# Patient Record
Sex: MALE | Race: WHITE | Employment: PART TIME | URBAN - METROPOLITAN AREA
[De-identification: names, ages, dates, MRNs, and addresses within clinical notes are randomized per-mention and may not be internally consistent; named-entity substitution may affect disease eponyms.]

---

## 2018-04-02 ENCOUNTER — TELEPHONE (OUTPATIENT)
Dept: INTERNAL MEDICINE CLINIC | Age: 28
End: 2018-04-02

## 2020-01-03 ENCOUNTER — HOSPITAL ENCOUNTER (EMERGENCY)
Age: 30
Discharge: HOME | End: 2020-01-03
Attending: EMERGENCY MEDICINE
Payer: COMMERCIAL

## 2020-01-03 ENCOUNTER — APPOINTMENT (OUTPATIENT)
Dept: CT IMAGING | Age: 30
End: 2020-01-03
Payer: COMMERCIAL

## 2020-01-03 VITALS
HEART RATE: 90 BPM | SYSTOLIC BLOOD PRESSURE: 124 MMHG | TEMPERATURE: 98.1 F | WEIGHT: 265 LBS | BODY MASS INDEX: 35.89 KG/M2 | OXYGEN SATURATION: 98 % | HEIGHT: 72 IN | DIASTOLIC BLOOD PRESSURE: 71 MMHG | RESPIRATION RATE: 16 BRPM

## 2020-01-03 LAB
ALBUMIN SERPL-MCNC: 4.6 G/DL (ref 3.5–4.6)
ALP BLD-CCNC: 50 U/L (ref 35–104)
ALT SERPL-CCNC: 63 U/L (ref 0–41)
ANION GAP SERPL CALCULATED.3IONS-SCNC: 9 MEQ/L (ref 9–15)
AST SERPL-CCNC: 34 U/L (ref 0–40)
BASOPHILS ABSOLUTE: 0 K/UL (ref 0–0.2)
BASOPHILS RELATIVE PERCENT: 0.7 %
BILIRUB SERPL-MCNC: 0.3 MG/DL (ref 0.2–0.7)
BUN BLDV-MCNC: 12 MG/DL (ref 6–20)
CALCIUM SERPL-MCNC: 9.4 MG/DL (ref 8.5–9.9)
CHLORIDE BLD-SCNC: 98 MEQ/L (ref 95–107)
CO2: 30 MEQ/L (ref 20–31)
CREAT SERPL-MCNC: 0.71 MG/DL (ref 0.7–1.2)
EOSINOPHILS ABSOLUTE: 0.1 K/UL (ref 0–0.7)
EOSINOPHILS RELATIVE PERCENT: 2.5 %
GFR AFRICAN AMERICAN: >60
GFR NON-AFRICAN AMERICAN: >60
GLOBULIN: 3.2 G/DL (ref 2.3–3.5)
GLUCOSE BLD-MCNC: 98 MG/DL (ref 70–99)
HCT VFR BLD CALC: 41.4 % (ref 42–52)
HEMOGLOBIN: 14.2 G/DL (ref 14–18)
LYMPHOCYTES ABSOLUTE: 1.7 K/UL (ref 1–4.8)
LYMPHOCYTES RELATIVE PERCENT: 37.1 %
MCH RBC QN AUTO: 31.1 PG (ref 27–31.3)
MCHC RBC AUTO-ENTMCNC: 34.2 % (ref 33–37)
MCV RBC AUTO: 90.8 FL (ref 80–100)
MONOCYTES ABSOLUTE: 0.6 K/UL (ref 0.2–0.8)
MONOCYTES RELATIVE PERCENT: 12.5 %
NEUTROPHILS ABSOLUTE: 2.2 K/UL (ref 1.4–6.5)
NEUTROPHILS RELATIVE PERCENT: 47.2 %
PDW BLD-RTO: 12.6 % (ref 11.5–14.5)
PLATELET # BLD: 224 K/UL (ref 130–400)
POC CREATININE WHOLE BLOOD: 0.8
POTASSIUM SERPL-SCNC: 4.2 MEQ/L (ref 3.4–4.9)
RBC # BLD: 4.57 M/UL (ref 4.7–6.1)
SODIUM BLD-SCNC: 137 MEQ/L (ref 135–144)
TOTAL PROTEIN: 7.8 G/DL (ref 6.3–8)
WBC # BLD: 4.7 K/UL (ref 4.8–10.8)

## 2020-01-03 PROCEDURE — 70496 CT ANGIOGRAPHY HEAD: CPT

## 2020-01-03 PROCEDURE — 99284 EMERGENCY DEPT VISIT MOD MDM: CPT

## 2020-01-03 PROCEDURE — 85025 COMPLETE CBC W/AUTO DIFF WBC: CPT

## 2020-01-03 PROCEDURE — 80053 COMPREHEN METABOLIC PANEL: CPT

## 2020-01-03 PROCEDURE — 6360000004 HC RX CONTRAST MEDICATION: Performed by: EMERGENCY MEDICINE

## 2020-01-03 PROCEDURE — 36415 COLL VENOUS BLD VENIPUNCTURE: CPT

## 2020-01-03 RX ORDER — M-VIT,TX,IRON,MINS/CALC/FOLIC 27MG-0.4MG
1 TABLET ORAL DAILY
COMMUNITY

## 2020-01-03 RX ORDER — METOCLOPRAMIDE HYDROCHLORIDE 5 MG/ML
10 INJECTION INTRAMUSCULAR; INTRAVENOUS ONCE
Status: DISCONTINUED | OUTPATIENT
Start: 2020-01-03 | End: 2020-01-03 | Stop reason: HOSPADM

## 2020-01-03 RX ORDER — DIPHENHYDRAMINE HYDROCHLORIDE 50 MG/ML
25 INJECTION INTRAMUSCULAR; INTRAVENOUS ONCE
Status: DISCONTINUED | OUTPATIENT
Start: 2020-01-03 | End: 2020-01-03 | Stop reason: HOSPADM

## 2020-01-03 RX ORDER — 0.9 % SODIUM CHLORIDE 0.9 %
1000 INTRAVENOUS SOLUTION INTRAVENOUS ONCE
Status: DISCONTINUED | OUTPATIENT
Start: 2020-01-03 | End: 2020-01-03 | Stop reason: HOSPADM

## 2020-01-03 RX ORDER — ACETAMINOPHEN 500 MG
1000 TABLET ORAL ONCE
Status: DISCONTINUED | OUTPATIENT
Start: 2020-01-03 | End: 2020-01-03 | Stop reason: HOSPADM

## 2020-01-03 RX ORDER — KETOROLAC TROMETHAMINE 30 MG/ML
30 INJECTION, SOLUTION INTRAMUSCULAR; INTRAVENOUS ONCE
Status: DISCONTINUED | OUTPATIENT
Start: 2020-01-03 | End: 2020-01-03 | Stop reason: HOSPADM

## 2020-01-03 RX ORDER — METOCLOPRAMIDE 10 MG/1
10 TABLET ORAL 2 TIMES DAILY PRN
Qty: 60 TABLET | Refills: 0 | Status: SHIPPED | OUTPATIENT
Start: 2020-01-03

## 2020-01-03 RX ADMIN — IOPAMIDOL 100 ML: 755 INJECTION, SOLUTION INTRAVENOUS at 15:18

## 2020-01-03 ASSESSMENT — PAIN DESCRIPTION - PAIN TYPE: TYPE: ACUTE PAIN

## 2020-01-03 ASSESSMENT — ENCOUNTER SYMPTOMS
NAUSEA: 0
BACK PAIN: 0
SORE THROAT: 0
DIARRHEA: 0
SHORTNESS OF BREATH: 0
COUGH: 0
VOMITING: 0
ABDOMINAL PAIN: 0

## 2020-01-03 ASSESSMENT — PAIN DESCRIPTION - ONSET: ONSET: ON-GOING

## 2020-01-03 ASSESSMENT — PAIN DESCRIPTION - FREQUENCY: FREQUENCY: INTERMITTENT

## 2020-01-03 ASSESSMENT — PAIN DESCRIPTION - DESCRIPTORS: DESCRIPTORS: ACHING

## 2020-01-03 ASSESSMENT — PAIN DESCRIPTION - PROGRESSION: CLINICAL_PROGRESSION: GRADUALLY WORSENING

## 2020-01-03 ASSESSMENT — PAIN DESCRIPTION - LOCATION: LOCATION: HEAD;NECK

## 2020-01-03 ASSESSMENT — PAIN SCALES - GENERAL: PAINLEVEL_OUTOF10: 2

## 2020-01-03 NOTE — ED PROVIDER NOTES
[address] [facility] ED  eMERGENCYdEPARTMENT eNCOUnter      Pt Name: Severiano Saleh  MRN: [id number]  Gustavogfadin 1990  Date of evaluation: 1/3/2020  Jose Juan Chaparro MD    CHIEF COMPLAINT           HPI  Severiano Saleh is a 34 y.o. male per chart review has a h/o asthma, psoriasis presents to the ED with headache. Pt notes for the past couple months he gets gradual onset, moderate, constant, throbbing, posterior headache with exertion or sex. After headache pt notes tingling in bilateral arms and legs. Pt denies fever, n/v, cp, sob, dysuria, diarrhea. Pt currently notes headache. ROS  Review of Systems   Constitutional: Negative for activity change, chills and fever. HENT: Negative for ear pain and sore throat. Eyes: Negative for visual disturbance. Respiratory: Negative for cough and shortness of breath. Cardiovascular: Negative for chest pain, palpitations and leg swelling. Gastrointestinal: Negative for abdominal pain, diarrhea, nausea and vomiting. Genitourinary: Negative for dysuria. Musculoskeletal: Negative for back pain. Skin: Negative for rash. Neurological: Positive for numbness and headaches. Negative for dizziness and weakness. Except as noted above the remainder of the review of systems was reviewed and negative.        PAST MEDICAL HISTORY     Past Medical History:   Diagnosis Date    Acne     Allergic rhinitis     Allergic rhinitis     Asthma     EXERCISE INDUCED    Chondromalacia KNEES    PAINFUL KNEES-ATHLETICS    Psoriasis     Not verified    Scalp cyst [id number]         SURGICAL HISTORY       Past Surgical History:   Procedure Laterality Date    FRACTURE SURGERY  L WRIST         CURRENTMEDICATIONS       Previous Medications    LORATADINE (CLARITIN PO)    Take by mouth    MULTIPLE VITAMINS-MINERALS (THERAPEUTIC MULTIVITAMIN-MINERALS) TABLET    Take 1 tablet by mouth daily       ALLERGIES     B2-b3-beta [id number]-c-[id number]-[id number]-mk [prosight complete or] nuchal rigidity  Cardiovascular:      Rate and Rhythm: Normal rate and regular rhythm. Heart sounds: Normal heart sounds. Pulmonary:      Effort: Pulmonary effort is normal.      Breath sounds: Normal breath sounds. Abdominal:      General: Bowel sounds are normal. There is no distension. Palpations: Abdomen is soft. Tenderness: There is no tenderness. Musculoskeletal: Normal range of motion. Skin:     General: Skin is warm and dry. Neurological:      Mental Status: He is alert and oriented to person, place, and time. Psychiatric:         Mood and Affect: Mood normal.           MDM  35 yo male presents to the ED with headache. Pt is afebrile, hemodynamically stable. Pt given 1 L NS, IV reglan, IV benadryl, IV toradol, PO tylenol with moderate relief. Labs unremarkable. Due to concern for aneurysm, ICH, CTA head done. CTA head negative. Pt reassessed and feels completely better. Pt educated about headache and migraines. Pt given prescription for reglan, given headache warning signs and will f/u with pcp. Pt understands plan. FINAL IMPRESSION      1.  Acute nonintractable headache, unspecified headache type          DISPOSITION/PLAN   DISPOSITION Decision To Discharge 01/03/2020 04:12:26 PM        DISCHARGE MEDICATIONS:  [unfilled]         Denilson Godwin MD(electronically signed)  Attending Emergency Physician            Denilson Godwin MD  01/03/20 [id number]

## 2020-01-03 NOTE — DISCHARGE INSTR - COC
Therapy:        Elimination:  Continence:   · Bowel: {YES / EV:64552}  · Bladder: {YES / AL:86084}  Urinary Catheter: {Urinary Catheter:328539796}   Colostomy/Ileostomy/Ileal Conduit: {YES / FL:97735}       Date of Last BM: ***  No intake or output data in the 24 hours ending 20 1613  No intake/output data recorded.     Safety Concerns:     [facility] Safety Concerns:544246944}    Impairments/Disabilities:      [facility] Impairments/Disabilities:844177361}    Nutrition Therapy:  Current Nutrition Therapy:   [facility] Diet List:171058815}    Routes of Feeding: {CHP DME Other Feedings:053456596}  Liquids: {Slp liquid thickness:91298}  Daily Fluid Restriction: {CHP DME Yes amt example:031663424}  Last Modified Barium Swallow with Video (Video Swallowing Test): {Done Not Done RTMO:658241308}    Treatments at the Time of Hospital Discharge:   Respiratory Treatments: ***  Oxygen Therapy:  {Therapy; copd oxygen:87298}  Ventilator:    { CC Vent CSKI:782453640}    Rehab Therapies: {THERAPEUTIC INTERVENTION:6919029640}  Weight Bearing Status/Restrictions: [address] [place] [address] Weight Bearin}  Other Medical Equipment (for information only, NOT a DME order):  {EQUIPMENT:753035475}  Other Treatments: ***    Patient's personal belongings (please select all that are sent with patient):  {[facility] DME Belongings:904321441}    RN SIGNATURE:  {Esignature:591022910}    CASE MANAGEMENT/SOCIAL WORK SECTION    Inpatient Status Date: ***    Readmission Risk Assessment Score:  Readmission Risk              Risk of Unplanned Readmission:        0           Discharging to Facility/ Agency   · Name:   · Address:  · Phone:  · Fax:    Dialysis Facility (if applicable)   · Name:  · Address:  · Dialysis Schedule:  · Phone:  · Fax:    / signature: {Esignature:667566643}    PHYSICIAN SECTION    Prognosis: {Prognosis:3002938888}    Condition at Discharge: [facility] Patient Condition:447900397}    Rehab Potential (if transferring to Rehab): {Prognosis:4247589210}    Recommended Labs or Other Treatments After Discharge: ***    Physician Certification: I certify the above information and transfer of Aurora Martinez  is necessary for the continuing treatment of the diagnosis listed and that he requires {Admit to Appropriate Level of Care:30893} for {GREATER/LESS:818484195} 30 days.      Update Admission H&P: {CHP DME Changes in EGBXS:776971871}    PHYSICIAN SIGNATURE:  {Esignature:694375766}

## 2020-01-03 NOTE — ED TRIAGE NOTES
Patient arrived from home with complaint of headache, increased HR, and neck pain since having intercourse with girlfriend on Monday. Patient states \"when I have sex I have no pain then when I am about to orgasm I can feel the headache coming on and it wont go away\". msp intact. Neuro wnl. No distress noted.

## 2020-01-04 LAB
GFR AFRICAN AMERICAN: >60
GFR NON-AFRICAN AMERICAN: >60
PERFORMED ON: ABNORMAL
POC CREATININE: 0.8 MG/DL (ref 0.9–1.3)
POC SAMPLE TYPE: ABNORMAL

## 2020-10-06 ENCOUNTER — TELEPHONE (OUTPATIENT)
Dept: FAMILY MEDICINE CLINIC | Age: 30
End: 2020-10-06

## 2020-10-06 ENCOUNTER — VIRTUAL VISIT (OUTPATIENT)
Dept: FAMILY MEDICINE CLINIC | Age: 30
End: 2020-10-06
Payer: COMMERCIAL

## 2020-10-06 ENCOUNTER — NURSE ONLY (OUTPATIENT)
Dept: FAMILY MEDICINE CLINIC | Age: 30
End: 2020-10-06

## 2020-10-06 DIAGNOSIS — Z20.822 COVID-19 RULED OUT: ICD-10-CM

## 2020-10-06 PROCEDURE — 99442 PR PHYS/QHP TELEPHONE EVALUATION 11-20 MIN: CPT | Performed by: PHYSICIAN ASSISTANT

## 2020-10-06 RX ORDER — LORATADINE 10 MG/1
10 TABLET ORAL DAILY
Qty: 30 TABLET | Refills: 3 | Status: SHIPPED | OUTPATIENT
Start: 2020-10-06

## 2020-10-06 SDOH — ECONOMIC STABILITY: INCOME INSECURITY: HOW HARD IS IT FOR YOU TO PAY FOR THE VERY BASICS LIKE FOOD, HOUSING, MEDICAL CARE, AND HEATING?: NOT HARD AT ALL

## 2020-10-06 SDOH — ECONOMIC STABILITY: FOOD INSECURITY: WITHIN THE PAST 12 MONTHS, YOU WORRIED THAT YOUR FOOD WOULD RUN OUT BEFORE YOU GOT MONEY TO BUY MORE.: NEVER TRUE

## 2020-10-06 SDOH — ECONOMIC STABILITY: FOOD INSECURITY: WITHIN THE PAST 12 MONTHS, THE FOOD YOU BOUGHT JUST DIDN'T LAST AND YOU DIDN'T HAVE MONEY TO GET MORE.: NEVER TRUE

## 2020-10-06 SDOH — ECONOMIC STABILITY: TRANSPORTATION INSECURITY
IN THE PAST 12 MONTHS, HAS THE LACK OF TRANSPORTATION KEPT YOU FROM MEDICAL APPOINTMENTS OR FROM GETTING MEDICATIONS?: NO

## 2020-10-06 SDOH — ECONOMIC STABILITY: TRANSPORTATION INSECURITY
IN THE PAST 12 MONTHS, HAS LACK OF TRANSPORTATION KEPT YOU FROM MEETINGS, WORK, OR FROM GETTING THINGS NEEDED FOR DAILY LIVING?: NO

## 2020-10-06 ASSESSMENT — ENCOUNTER SYMPTOMS
VOMITING: 0
RHINORRHEA: 0
NAUSEA: 1
DIARRHEA: 1
ABDOMINAL PAIN: 0
COUGH: 1
SHORTNESS OF BREATH: 1
SORE THROAT: 1

## 2020-10-06 ASSESSMENT — PATIENT HEALTH QUESTIONNAIRE - PHQ9
2. FEELING DOWN, DEPRESSED OR HOPELESS: 0
SUM OF ALL RESPONSES TO PHQ QUESTIONS 1-9: 0
1. LITTLE INTEREST OR PLEASURE IN DOING THINGS: 0
SUM OF ALL RESPONSES TO PHQ9 QUESTIONS 1 & 2: 0
SUM OF ALL RESPONSES TO PHQ QUESTIONS 1-9: 0

## 2020-10-06 NOTE — TELEPHONE ENCOUNTER
Patient will need a note excusing him from work and school for the next 2 weeks.   His mother will  the note

## 2020-10-06 NOTE — PROGRESS NOTES
Breath sounds: No wheezing. Neurological:      Mental Status: He is oriented to person, place, and time. Psychiatric:         Mood and Affect: Mood normal.         Behavior: Behavior normal.         Thought Content: Thought content normal.         Judgment: Judgment normal.              Assessment & Plan    Diagnosis Orders   1. COVID-19 ruled out  COVID-19 Ambulatory   2. Allergic rhinitis, unspecified seasonality, unspecified trigger           Orders Placed This Encounter   Procedures    COVID-19 Ambulatory     Standing Status:   Future     Standing Expiration Date:   10/6/2021     Scheduling Instructions:      Saline media preferred given current shortage of viral transport media but both acceptable     Order Specific Question:   Is this test for diagnosis or screening? Answer:   Diagnosis of ill patient     Order Specific Question:   Symptomatic for COVID-19 as defined by CDC? Answer:   Yes     Order Specific Question:   Date of Symptom Onset     Answer:   10/4/2020     Order Specific Question:   Hospitalized for COVID-19? Answer:   No     Order Specific Question:   Admitted to ICU for COVID-19? Answer:   No     Order Specific Question:   Employed in healthcare setting? Answer:   No     Order Specific Question:   Resident in a congregate (group) care setting? Answer:   No     Order Specific Question:   Pregnant: Answer:   No     Order Specific Question:   Previously tested for COVID-19? Answer:   No     Orders Placed This Encounter   Medications    loratadine (CLARITIN) 10 MG tablet     Sig: Take 1 tablet by mouth daily     Dispense:  30 tablet     Refill:  3     Medications Discontinued During This Encounter   Medication Reason    Loratadine (CLARITIN PO) REORDER     Return if symptoms worsen or fail to improve, for call for results, follow up with your PCP as directed. An electronic signature was used to authenticate this note.     --Katharina Casanova PA-C on 10/6/2020 at 12:13 PM

## 2020-10-07 ENCOUNTER — VIRTUAL VISIT (OUTPATIENT)
Dept: FAMILY MEDICINE CLINIC | Age: 30
End: 2020-10-07
Payer: COMMERCIAL

## 2020-10-07 PROCEDURE — G8484 FLU IMMUNIZE NO ADMIN: HCPCS | Performed by: PHYSICIAN ASSISTANT

## 2020-10-07 PROCEDURE — 1036F TOBACCO NON-USER: CPT | Performed by: PHYSICIAN ASSISTANT

## 2020-10-07 PROCEDURE — 99213 OFFICE O/P EST LOW 20 MIN: CPT | Performed by: PHYSICIAN ASSISTANT

## 2020-10-07 PROCEDURE — G8417 CALC BMI ABV UP PARAM F/U: HCPCS | Performed by: PHYSICIAN ASSISTANT

## 2020-10-07 PROCEDURE — G8428 CUR MEDS NOT DOCUMENT: HCPCS | Performed by: PHYSICIAN ASSISTANT

## 2020-10-07 RX ORDER — TOBRAMYCIN 0.3 %
0.5 OINTMENT (GRAM) OPHTHALMIC (EYE) 2 TIMES DAILY
Qty: 1 TUBE | Refills: 0 | Status: SHIPPED | OUTPATIENT
Start: 2020-10-07 | End: 2020-10-17

## 2020-10-07 ASSESSMENT — ENCOUNTER SYMPTOMS
EYE PAIN: 0
EYE DISCHARGE: 0
PHOTOPHOBIA: 0
EYE ITCHING: 0
EYE REDNESS: 0

## 2020-10-07 NOTE — PROGRESS NOTES
10/7/2020     Rita Gray (:  1990) is a 31 y.o. male, here for evaluation of the following medical concerns:  Redness/swelling of right upper eyelid  HPI  Telemedicine doxy video visit due to concern for exposure to Covid 19 (coronavirus). Patient is aware this is a billable visit patient was identified at start of exam patient was at his home while I practiced  Remotely. With complaint of pain and swelling of the right upper eyelid since yesterday. States pain is minimal today denies visual change does not wear corrective lenses denies injury    Review of Systems   Eyes: Negative for photophobia, pain, discharge, redness, itching and visual disturbance. All other systems reviewed and are negative. Prior to Visit Medications    Medication Sig Taking? Authorizing Provider   loratadine (CLARITIN) 10 MG tablet Take 1 tablet by mouth daily  Katharina Casanova PA-C   Multiple Vitamins-Minerals (THERAPEUTIC MULTIVITAMIN-MINERALS) tablet Take 1 tablet by mouth daily  Historical Provider, MD   metoclopramide (REGLAN) 10 MG tablet Take 1 tablet by mouth 2 times daily as needed (Headache)  Andrew Nava MD        Social History     Tobacco Use    Smoking status: Never Smoker    Smokeless tobacco: Former User   Substance Use Topics    Alcohol use: Yes     Comment: RARE        There were no vitals filed for this visit. Estimated body mass index is 35.94 kg/m² as calculated from the following:    Height as of 1/3/20: 6' (1.829 m). Weight as of 1/3/20: 265 lb (120.2 kg). Physical Exam  Constitutional:       General: He is not in acute distress. Appearance: Normal appearance. HENT:      Head: Normocephalic and atraumatic. Eyes:      Extraocular Movements: Extraocular movements intact.       Conjunctiva/sclera: Conjunctivae normal.      Comments: Erythema mild swelling right upper eyelid patient appears to have a papular lesion along medial upper eyelash   Pulmonary:      Effort: Pulmonary effort is normal. No respiratory distress. Breath sounds: No wheezing. Skin:     General: Skin is warm and dry. Coloration: Skin is not pale. Neurological:      Mental Status: He is alert and oriented to person, place, and time. Psychiatric:         Mood and Affect: Mood normal.         Thought Content: Thought content normal.         Judgment: Judgment normal.         ASSESSMENT/PLAN:  There are no diagnoses linked to this encounter. No follow-ups on file. An electronic signature was used to authenticate this note.     --Katharina Casanova PA-C on 10/7/2020 at 12:14 PM

## 2020-10-08 LAB
SARS-COV-2: NOT DETECTED
SOURCE: NORMAL

## 2020-10-09 ENCOUNTER — TELEPHONE (OUTPATIENT)
Dept: FAMILY MEDICINE CLINIC | Age: 30
End: 2020-10-09

## 2020-10-09 NOTE — TELEPHONE ENCOUNTER
Pt dropped off return to work form for [employer] program. Needs signed ASAP. Contact Pt when ready for . Pt advised could take up to 72 hours. Form scanned in media, attached to encounter & placed in box.

## 2020-10-12 NOTE — TELEPHONE ENCOUNTER
Please let patient know STEPH De La Rosa will not be in the office until Thursday. Form is on her desk.

## 2020-10-12 NOTE — TELEPHONE ENCOUNTER
Per Dr. Alexey Barr was transferred to desk of [address] [employer]. Esmer CHRIS Was doing this. Pt needs this form ASAP. If somehow this can be printed at home, signed and scanned back in Patient would be appreciative as I told him you would not be in office until Thursday. Please advise.  TY

## 2020-10-12 NOTE — TELEPHONE ENCOUNTER
I last saw this patient in 2016. If someone else saw him for an illness that he is off from work for, that is who you should give the paperwork to. Thanks.

## 2021-06-15 ENCOUNTER — OFFICE VISIT (OUTPATIENT)
Dept: FAMILY MEDICINE CLINIC | Age: 31
End: 2021-06-15
Payer: COMMERCIAL

## 2021-06-15 VITALS
HEART RATE: 59 BPM | OXYGEN SATURATION: 97 % | DIASTOLIC BLOOD PRESSURE: 74 MMHG | HEIGHT: 71 IN | SYSTOLIC BLOOD PRESSURE: 108 MMHG | TEMPERATURE: 97.8 F | BODY MASS INDEX: 32.87 KG/M2 | WEIGHT: 234.8 LBS

## 2021-06-15 DIAGNOSIS — H72.91 RUPTURED TYMPANIC MEMBRANE, RIGHT: Primary | ICD-10-CM

## 2021-06-15 DIAGNOSIS — H73.20 TYMPANIC MEMBRANE INFLAMMATION: ICD-10-CM

## 2021-06-15 PROCEDURE — G8417 CALC BMI ABV UP PARAM F/U: HCPCS | Performed by: NURSE PRACTITIONER

## 2021-06-15 PROCEDURE — G8427 DOCREV CUR MEDS BY ELIG CLIN: HCPCS | Performed by: NURSE PRACTITIONER

## 2021-06-15 PROCEDURE — 99213 OFFICE O/P EST LOW 20 MIN: CPT | Performed by: NURSE PRACTITIONER

## 2021-06-15 PROCEDURE — 1036F TOBACCO NON-USER: CPT | Performed by: NURSE PRACTITIONER

## 2021-06-15 RX ORDER — OFLOXACIN 3 MG/ML
5 SOLUTION AURICULAR (OTIC) 2 TIMES DAILY
Qty: 3.5 ML | Refills: 0 | Status: SHIPPED | OUTPATIENT
Start: 2021-06-15 | End: 2021-06-22

## 2021-06-15 RX ORDER — OMEGA-3/DHA/EPA/FISH OIL 60 MG-90MG
CAPSULE ORAL
COMMUNITY

## 2021-06-15 RX ORDER — FLUTICASONE PROPIONATE 50 MCG
SPRAY, SUSPENSION (ML) NASAL
COMMUNITY
Start: 2021-03-23

## 2021-06-15 ASSESSMENT — PATIENT HEALTH QUESTIONNAIRE - PHQ9
SUM OF ALL RESPONSES TO PHQ QUESTIONS 1-9: 0
1. LITTLE INTEREST OR PLEASURE IN DOING THINGS: 0
SUM OF ALL RESPONSES TO PHQ QUESTIONS 1-9: 0
2. FEELING DOWN, DEPRESSED OR HOPELESS: 0
SUM OF ALL RESPONSES TO PHQ9 QUESTIONS 1 & 2: 0
SUM OF ALL RESPONSES TO PHQ QUESTIONS 1-9: 0

## 2021-06-15 ASSESSMENT — ENCOUNTER SYMPTOMS: RHINORRHEA: 0

## 2021-06-15 NOTE — PROGRESS NOTES
Exam  Vitals reviewed. Constitutional:       General: He is not in acute distress. HENT:      Head: Normocephalic and atraumatic. Right Ear: External ear normal. Swelling and tenderness present. No drainage. No mastoid tenderness. Tympanic membrane is injected and perforated (small, linear, anterior to malleolus). Left Ear: External ear normal. No tenderness. No mastoid tenderness. Nose: Congestion present. Right Nostril: No occlusion. Left Nostril: No occlusion. Mouth/Throat:      Lips: Pink. No lesions. Mouth: Mucous membranes are moist. No oral lesions. Pharynx: Oropharynx is clear. Uvula midline. No oropharyngeal exudate or posterior oropharyngeal erythema. Eyes:      General: Lids are normal.      Extraocular Movements: Extraocular movements intact. Conjunctiva/sclera: Conjunctivae normal.   Neck:      Vascular: No JVD. Trachea: Trachea and phonation normal.   Cardiovascular:      Rate and Rhythm: Normal rate and regular rhythm. Heart sounds: Normal heart sounds. Pulmonary:      Effort: Pulmonary effort is normal. No tachypnea. Musculoskeletal:         General: Normal range of motion. Cervical back: Normal range of motion and neck supple. Lymphadenopathy:      Cervical: No cervical adenopathy. Skin:     General: Skin is warm and dry. Neurological:      General: No focal deficit present. Mental Status: He is alert. Mental status is at baseline. Gait: Gait is intact. Psychiatric:         Mood and Affect: Mood and affect normal.     POC Testing Today: No results found for this visit on 06/15/21. ASSESSMENT & PLAN:   Tereso Hyde was seen today for otalgia. Right TM perforation on exam. No otorrhea. Will treat with anti-infective ear drops given hx and exam findings. Reviewed supportive care and symptoms that are suggestive of worsening, in which case pt will need to follow-up w/ PCP or return to [facility].     Diagnoses and all orders for this visit:    ICD-10-CM    1. Ruptured tympanic membrane, right  H72.91    2. Tympanic membrane inflammation  H73.20 ofloxacin (FLOXIN) 0.3 % otic solution     -      Water exclusion until treatment complete an/or ear re-check  - Dry outer ears completely after swimming/ bathing  - Avoid activities that risk barotrauma until symptoms are fully resolved/ ear re-check    Return for follow-up as needed if symptoms worsen or fail to improve in the next 3 days. Side effects and adverse effects of any medication prescribed today, as well as treatment plan/rationale, follow-up care, and result expectations have been discussed with the patient. Naima Berger expresses understanding and wishes to proceed with the plan. Discussed signs and symptoms which require immediate follow-up in ED/call to 911. Understanding verbalized. I have reviewed and updated the electronic medical record.     Analia Valadez, APRN - CNP

## 2021-06-15 NOTE — PATIENT INSTRUCTIONS
1. Prescription for anti-infective ear drops sent to your pharmacy. 2. Symptomatic therapy suggested: use acetaminophen, ibuprofen, antihistamine-decongestant of choice as needed. 3. Use ear plugs when you swim. Dry your outer ears completely after you swim or bathe. 4. Call or return to clinic if symptoms worsen or fail to improve as anticipated. Ear Drops: 3-5 drops in the affected ear two times per day for up to 7 days  To use the drops:  - Lie down or tilt your head with your ear facing upward. Open the ear canal by gently pulling your ear back, or pulling downward on the earlobe when giving this medicine to a child. - Hold the dropper upside down over your ear and drop the correct number of drops into the ear. - Stay lying down or with your head tilted for 2 to 5 minutes. You may use a small piece of cotton to plug the ear and keep the medicine from draining out. Return or see PCP for hearing loss, discharge from your ear, headaches, fevers, recurrent vomiting, or any other concerning symptoms    Perforated Eardrum: Care Instructions  Your Care Instructions     A tear or hole in the membrane of the middle ear is called a perforated or ruptured eardrum. This can happen if an infection builds up inside the ear or if the eardrum gets injured. You may find it hard to hear out of that ear or may hear a buzzing sound. You may have an earache or have fluids that drain from the ear. Your eardrum should heal on its own in a few weeks, and you should hear normally then. If you have an infection, your doctor may prescribe antibiotics. You may need pain relief medicine for your earache. Your doctor will check to see if your eardrum has healed. If not, you may need surgery to repair the eardrum. Follow-up care is a key part of your treatment and safety. Be sure to make and go to all appointments, and call your doctor if you are having problems.  It's also a good idea to know your test results and keep a list of the medicines you take. How can you care for yourself at home? · If your doctor prescribed antibiotics, take them as directed. Do not stop taking them just because you feel better. You need to take the full course of antibiotics. · Take an over-the-counter pain medicine, such as acetaminophen (Tylenol), ibuprofen (Advil, Motrin), or naproxen (Aleve), as needed. Read and follow all instructions on the label. · Do not take two or more pain medicines at the same time unless the doctor told you to. Many pain medicines have acetaminophen, which is Tylenol. Too much acetaminophen (Tylenol) can be harmful. · To ease pain, put a warm washcloth or a heating pad set on low on your ear. You may have some drainage from the ear. · Be careful when taking over-the-counter cold or flu medicines and Tylenol at the same time. Many of these medicines have acetaminophen, which is Tylenol. Read the labels to make sure that you are not taking more than the recommended dose. Too much Tylenol can be harmful. · Keep your ears dry. ? Take baths until your doctor says you can take showers again. ? When you wash your hair, use cotton lightly coated with petroleum jelly as an earplug. Do not use plastic earplugs. ? Do not swim until your doctor says you can.  ? If you get water in your ears, turn your head to each side and pull the earlobe in different directions. This will help the water run out. If your ears are still wet, use a hair dryer set on the lowest heat. Hold the dryer several inches from your ear. · Do not put anything into your ear canal. For example, do not use a cotton swab to clean the inside of your ear. It can damage your ear. If you think you have something inside your ear, ask your doctor to check it. When should you call for help? Call your doctor now or seek immediate medical care if:    · You have signs of infection, such as:  ? Increased pain, swelling, warmth, or redness.   ? Pus draining from the ear.  ? A fever. Watch closely for changes in your health, and be sure to contact your doctor if:    · You have changes in hearing.     · You do not get better as expected. Where can you learn more? Go to [email or web address]. org and sign in to your [employer] account. Enter L115 in the [employer] box to learn more about \"Perforated Eardrum: Care Instructions. \"     If you do not have an account, please click on the \"Sign Up Now\" link. Current as of: December 2, 2020               Content Version: 12.8  © 6565-3371 [employer]. Care instructions adapted under license by [facility] ([facility]). If you have questions about a medical condition or this instruction, always ask your healthcare professional. [address] any warranty or liability for your use of this information.

## 2021-07-07 ASSESSMENT — ENCOUNTER SYMPTOMS
SORE THROAT: 0
COUGH: 0
VOMITING: 0
CHEST TIGHTNESS: 0
DIARRHEA: 0
ABDOMINAL PAIN: 0
NAUSEA: 0
EYE REDNESS: 0